# Patient Record
Sex: MALE | Race: WHITE | NOT HISPANIC OR LATINO | Employment: FULL TIME | URBAN - METROPOLITAN AREA
[De-identification: names, ages, dates, MRNs, and addresses within clinical notes are randomized per-mention and may not be internally consistent; named-entity substitution may affect disease eponyms.]

---

## 2023-08-28 ENCOUNTER — LAB (OUTPATIENT)
Dept: LAB | Facility: LAB | Age: 33
End: 2023-08-28

## 2023-12-28 ENCOUNTER — APPOINTMENT (OUTPATIENT)
Dept: PRIMARY CARE | Facility: CLINIC | Age: 33
End: 2023-12-28
Payer: COMMERCIAL

## 2024-01-18 ENCOUNTER — APPOINTMENT (OUTPATIENT)
Dept: PRIMARY CARE | Facility: CLINIC | Age: 34
End: 2024-01-18
Payer: COMMERCIAL

## 2024-01-25 ENCOUNTER — APPOINTMENT (OUTPATIENT)
Dept: PRIMARY CARE | Facility: CLINIC | Age: 34
End: 2024-01-25
Payer: COMMERCIAL

## 2024-01-25 ENCOUNTER — OFFICE VISIT (OUTPATIENT)
Dept: PRIMARY CARE | Facility: CLINIC | Age: 34
End: 2024-01-25
Payer: COMMERCIAL

## 2024-01-25 VITALS
BODY MASS INDEX: 25.05 KG/M2 | SYSTOLIC BLOOD PRESSURE: 134 MMHG | WEIGHT: 189 LBS | DIASTOLIC BLOOD PRESSURE: 78 MMHG | HEIGHT: 73 IN | TEMPERATURE: 97.9 F | HEART RATE: 63 BPM

## 2024-01-25 DIAGNOSIS — Z29.89 NEED FOR MALARIA PROPHYLAXIS: ICD-10-CM

## 2024-01-25 DIAGNOSIS — E78.49 OTHER HYPERLIPIDEMIA: Primary | ICD-10-CM

## 2024-01-25 DIAGNOSIS — Z76.89 ENCOUNTER TO ESTABLISH CARE: ICD-10-CM

## 2024-01-25 DIAGNOSIS — Z23 NEED FOR HEPATITIS A VACCINATION: ICD-10-CM

## 2024-01-25 PROCEDURE — 99203 OFFICE O/P NEW LOW 30 MIN: CPT | Performed by: INTERNAL MEDICINE

## 2024-01-25 PROCEDURE — 1036F TOBACCO NON-USER: CPT | Performed by: INTERNAL MEDICINE

## 2024-01-25 PROCEDURE — 90471 IMMUNIZATION ADMIN: CPT | Performed by: INTERNAL MEDICINE

## 2024-01-25 PROCEDURE — 90632 HEPA VACCINE ADULT IM: CPT | Performed by: INTERNAL MEDICINE

## 2024-01-25 RX ORDER — ATOVAQUONE AND PROGUANIL HYDROCHLORIDE 250; 100 MG/1; MG/1
1 TABLET, FILM COATED ORAL DAILY
Qty: 30 TABLET | Refills: 0 | Status: SHIPPED | OUTPATIENT
Start: 2024-01-25

## 2024-01-25 RX ORDER — ATORVASTATIN CALCIUM 10 MG/1
10 TABLET, FILM COATED ORAL DAILY
COMMUNITY
Start: 2023-12-11 | End: 2024-01-25 | Stop reason: SDUPTHER

## 2024-01-25 RX ORDER — ATORVASTATIN CALCIUM 10 MG/1
10 TABLET, FILM COATED ORAL DAILY
Qty: 90 TABLET | Refills: 3 | Status: SHIPPED | OUTPATIENT
Start: 2024-01-25

## 2024-01-25 ASSESSMENT — ENCOUNTER SYMPTOMS
PALPITATIONS: 0
CHEST TIGHTNESS: 0
WEAKNESS: 0
BLOOD IN STOOL: 0
DIZZINESS: 0
SHORTNESS OF BREATH: 0
FREQUENCY: 0
UNEXPECTED WEIGHT CHANGE: 0
DECREASED CONCENTRATION: 0
ARTHRALGIAS: 0
DYSURIA: 0
COUGH: 0
ABDOMINAL PAIN: 0
LIGHT-HEADEDNESS: 0
SLEEP DISTURBANCE: 0
HEADACHES: 0
NERVOUS/ANXIOUS: 0
APPETITE CHANGE: 0
DIFFICULTY URINATING: 0
NECK PAIN: 0
CHILLS: 0
SORE THROAT: 0
ACTIVITY CHANGE: 0
FLANK PAIN: 0
WHEEZING: 0
BACK PAIN: 0

## 2024-01-25 NOTE — PROGRESS NOTES
Subjective   Patient ID: Charly Mccarthy is a 33 y.o. male.    [facility] [place] [facility]. Mccarthy is seen today for establishing care.  His medical history significant for hyperlipidemia for which he takes atorvastatin as prevention.  He does not have any chronic medical problems.  He does not have any health concerns.  He does not take any  other medications  He is a EP cardiologist at   He exercises 3 to 4 days a week  He lives with his wife and child  He is traveling to [place] next month and would like to get prophylaxis for malaria and hepatitis A vaccine.    Review of Systems   Constitutional:  Negative for activity change, appetite change, chills and unexpected weight change.   HENT:  Negative for congestion, postnasal drip and sore throat.    Eyes:  Negative for visual disturbance.   Respiratory:  Negative for cough, chest tightness, shortness of breath and wheezing.    Cardiovascular:  Negative for chest pain, palpitations and leg swelling.   Gastrointestinal:  Negative for abdominal pain and blood in stool.   Endocrine: Negative for cold intolerance and heat intolerance.   Genitourinary:  Negative for difficulty urinating, dysuria, flank pain and frequency.   Musculoskeletal:  Negative for arthralgias, back pain, gait problem and neck pain.   Skin:  Negative for rash.   Allergic/Immunologic: Negative for environmental allergies and food allergies.   Neurological:  Negative for dizziness, weakness, light-headedness and headaches.   Psychiatric/Behavioral:  Negative for decreased concentration and sleep disturbance. The patient is not nervous/anxious.        Objective   Physical Exam  Vitals reviewed.   Constitutional:       General: He is not in acute distress.     Appearance: Normal appearance.   HENT:      Head: Normocephalic and atraumatic.   Eyes:      Extraocular Movements: Extraocular movements intact.      Conjunctiva/sclera: Conjunctivae normal.      Pupils: Pupils are equal, round, and reactive to light.    Cardiovascular:      Rate and Rhythm: Normal rate and regular rhythm.      Pulses: Normal pulses.   Pulmonary:      Effort: Pulmonary effort is normal. No respiratory distress.      Breath sounds: Normal breath sounds.   Abdominal:      General: Bowel sounds are normal. There is no distension.      Tenderness: There is no abdominal tenderness.   Musculoskeletal:         General: No swelling or tenderness. Normal range of motion.      Cervical back: Normal range of motion.   Skin:     General: Skin is warm.   Neurological:      General: No focal deficit present.      Mental Status: He is alert.      Coordination: Coordination normal.      Gait: Gait normal.   Psychiatric:         Mood and Affect: Mood normal.         Behavior: Behavior normal.         Assessment/Plan   Diagnoses and all orders for this visit:  Other hyperlipidemia  Comments:  Continue atorvastatin  Lipid panel, liver function ordered  Continue with healthy diet  Orders:  -     CBC; Future  -     Comprehensive Metabolic Panel; Future  -     Lipid Panel; Future  -     TSH with reflex to Free T4 if abnormal; Future  -     atorvastatin (Lipitor) 10 mg tablet; Take 1 tablet (10 mg) by mouth once daily.  Encounter to establish care  Need for hepatitis A vaccination  Comments:  Hepatitis A vaccine administered    Blood work ordered  Continue with healthy diet  Start Malarone as prescribed before travel

## 2024-09-20 ENCOUNTER — LAB (OUTPATIENT)
Dept: LAB | Facility: LAB | Age: 34
End: 2024-09-20
Payer: COMMERCIAL

## 2024-09-20 LAB — COTININE UR QL SCN: NEGATIVE

## 2025-01-30 ENCOUNTER — APPOINTMENT (OUTPATIENT)
Dept: PRIMARY CARE | Facility: CLINIC | Age: 35
End: 2025-01-30
Payer: COMMERCIAL

## 2025-02-15 DIAGNOSIS — E78.49 OTHER HYPERLIPIDEMIA: ICD-10-CM

## 2025-02-18 RX ORDER — ATORVASTATIN CALCIUM 10 MG/1
10 TABLET, FILM COATED ORAL DAILY
Qty: 30 TABLET | Refills: 3 | Status: SHIPPED | OUTPATIENT
Start: 2025-02-18

## 2025-03-20 ENCOUNTER — TELEPHONE (OUTPATIENT)
Dept: CARDIOLOGY | Facility: HOSPITAL | Age: 35
End: 2025-03-20
Payer: COMMERCIAL